# Patient Record
Sex: MALE | Race: WHITE | NOT HISPANIC OR LATINO | Employment: FULL TIME | URBAN - METROPOLITAN AREA
[De-identification: names, ages, dates, MRNs, and addresses within clinical notes are randomized per-mention and may not be internally consistent; named-entity substitution may affect disease eponyms.]

---

## 2020-11-21 ENCOUNTER — AMBULATORY - HEALTHEAST (OUTPATIENT)
Dept: NURSING | Facility: CLINIC | Age: 40
End: 2020-11-21

## 2021-05-27 ENCOUNTER — RECORDS - HEALTHEAST (OUTPATIENT)
Dept: ADMINISTRATIVE | Facility: CLINIC | Age: 41
End: 2021-05-27

## 2024-03-21 ENCOUNTER — OFFICE VISIT (OUTPATIENT)
Dept: FAMILY MEDICINE | Facility: CLINIC | Age: 44
End: 2024-03-21
Payer: COMMERCIAL

## 2024-03-21 VITALS
TEMPERATURE: 97.7 F | BODY MASS INDEX: 23.25 KG/M2 | OXYGEN SATURATION: 98 % | HEIGHT: 69 IN | WEIGHT: 157 LBS | SYSTOLIC BLOOD PRESSURE: 102 MMHG | DIASTOLIC BLOOD PRESSURE: 65 MMHG | HEART RATE: 65 BPM | RESPIRATION RATE: 20 BRPM

## 2024-03-21 DIAGNOSIS — Z83.2 FAMILY HISTORY OF FACTOR V LEIDEN MUTATION: ICD-10-CM

## 2024-03-21 DIAGNOSIS — Z12.11 SCREEN FOR COLON CANCER: ICD-10-CM

## 2024-03-21 DIAGNOSIS — Z11.59 NEED FOR HEPATITIS C SCREENING TEST: ICD-10-CM

## 2024-03-21 DIAGNOSIS — Z80.8 FAMILY HISTORY OF MELANOMA: ICD-10-CM

## 2024-03-21 DIAGNOSIS — Z13.1 SCREENING FOR DIABETES MELLITUS: Primary | ICD-10-CM

## 2024-03-21 DIAGNOSIS — Z13.220 SCREENING FOR HYPERLIPIDEMIA: ICD-10-CM

## 2024-03-21 DIAGNOSIS — Z11.4 SCREENING FOR HIV (HUMAN IMMUNODEFICIENCY VIRUS): ICD-10-CM

## 2024-03-21 DIAGNOSIS — Z00.00 HEALTHCARE MAINTENANCE: ICD-10-CM

## 2024-03-21 LAB
FACTOR V INTERPRETATION: ABNORMAL
LAB DIRECTOR COMMENTS: ABNORMAL
LAB DIRECTOR DISCLAIMER: ABNORMAL
LAB DIRECTOR INTERPRETATION: ABNORMAL
LAB DIRECTOR METHODOLOGY: ABNORMAL
LAB DIRECTOR RESULTS: ABNORMAL
LOCATION OF TASK: ABNORMAL
SPECIMEN DESCRIPTION: ABNORMAL

## 2024-03-21 PROCEDURE — 99386 PREV VISIT NEW AGE 40-64: CPT | Mod: 25 | Performed by: FAMILY MEDICINE

## 2024-03-21 PROCEDURE — 90715 TDAP VACCINE 7 YRS/> IM: CPT | Performed by: FAMILY MEDICINE

## 2024-03-21 PROCEDURE — G0452 MOLECULAR PATHOLOGY INTERPR: HCPCS | Mod: 26 | Performed by: STUDENT IN AN ORGANIZED HEALTH CARE EDUCATION/TRAINING PROGRAM

## 2024-03-21 PROCEDURE — 82947 ASSAY GLUCOSE BLOOD QUANT: CPT | Performed by: FAMILY MEDICINE

## 2024-03-21 PROCEDURE — 87389 HIV-1 AG W/HIV-1&-2 AB AG IA: CPT | Performed by: FAMILY MEDICINE

## 2024-03-21 PROCEDURE — 81241 F5 GENE: CPT | Performed by: FAMILY MEDICINE

## 2024-03-21 PROCEDURE — 86803 HEPATITIS C AB TEST: CPT | Performed by: FAMILY MEDICINE

## 2024-03-21 PROCEDURE — 90471 IMMUNIZATION ADMIN: CPT | Performed by: FAMILY MEDICINE

## 2024-03-21 PROCEDURE — 36415 COLL VENOUS BLD VENIPUNCTURE: CPT | Performed by: FAMILY MEDICINE

## 2024-03-21 PROCEDURE — 80061 LIPID PANEL: CPT | Performed by: FAMILY MEDICINE

## 2024-03-21 SDOH — HEALTH STABILITY: PHYSICAL HEALTH: ON AVERAGE, HOW MANY MINUTES DO YOU ENGAGE IN EXERCISE AT THIS LEVEL?: 60 MIN

## 2024-03-21 SDOH — HEALTH STABILITY: PHYSICAL HEALTH: ON AVERAGE, HOW MANY DAYS PER WEEK DO YOU ENGAGE IN MODERATE TO STRENUOUS EXERCISE (LIKE A BRISK WALK)?: 5 DAYS

## 2024-03-21 ASSESSMENT — SOCIAL DETERMINANTS OF HEALTH (SDOH): HOW OFTEN DO YOU GET TOGETHER WITH FRIENDS OR RELATIVES?: ONCE A WEEK

## 2024-03-21 NOTE — ASSESSMENT & PLAN NOTE
Brother with blood clot and factor V Leiden mutation, father with history of pulmonary embolism.  Check

## 2024-03-21 NOTE — ASSESSMENT & PLAN NOTE
Mom, no particularly concerning lesions but we will have him see dermatology as a baseline given increased risk with family history in first-degree relative.

## 2024-03-21 NOTE — PROGRESS NOTES
Adult Male Physical  A/P  Healthcare maintenance  Healthcare maintenance assessment  Immunization-up-to-date COVID and flu, Tdap given today.  Has had hepatitis B series.  Offered hepatitis A-due to coming in contact with human waste at times in his job as a  at [employer] and possible travel  Cancer screening-colonoscopy  Vascular-blood pressure good, quit smoking a few years ago, check cholesterol  Other-hepatitis C and HIV ordered today.  Low risk.      Family history of melanoma  Mom, no particularly concerning lesions but we will have him see dermatology as a baseline given increased risk with family history in first-degree relative.    Family history of factor V Leiden mutation  Brother with blood clot and factor V Leiden mutation, father with history of pulmonary embolism.  Check         HPI  Current Concerns/ Questions  44-year-old, here to establish care.  No particular questions today.  Quit smoking a few years ago, smoked for 10 years.  Family history as noted above  Starting drinking more with his wife with pandemic but is cut back significantly and does not miss it.    PFSH:  Current medications reviewed as follows:  No current outpatient medications on file prior to visit.  No current facility-administered medications on file prior to visit.     Patient Active Problem List   Diagnosis    Healthcare maintenance    Family history of melanoma    Family history of factor V Leiden mutation     No past medical history on file.  No past surgical history on file.  Family History   Problem Relation Age of Onset    Melanoma Mother     Alcoholism Father     Pulmonary Embolism Father     Factor V Leiden deficiency Brother     Deep Vein Thrombosis (DVT) Brother      History   Smoking Status    Former    Packs/day: 1.00    Years: 10.00    Types: Cigarettes    Start date: 1/1/1996    Quit date: 1/1/2015   Smokeless Tobacco    Never   Works as  [employer].  .  2 children,  "born in about 2013 and 2018    Social History     Social History Narrative    Not on file     Immunization History   Administered Date(s) Administered    COVID-19 12+ (2023-24) (Pfizer) 10/13/2023    COVID-19 Bivalent 12+ (Pfizer) 09/22/2022    COVID-19 MONOVALENT 12+ (Pfizer) 12/12/2021    COVID-19 Monovalent 18+ (Moderna) 02/04/2021, 03/04/2021    DTAP (<7y) 08/05/1980    Hepatitis B, Adult 06/06/2019, 07/16/2019, 12/13/2019    Influenza Vaccine >6 months,quad, PF 11/21/2020, 12/12/2021, 12/20/2022, 10/13/2023    Influenza Vaccine, 6+MO IM (QUADRIVALENT W/PRESERVATIVES) 10/15/2018    MMR 02/05/1980, 04/12/1995    Polio, Unspecified  08/05/1980    TDAP (Adacel,Boostrix) 05/11/2010, 03/21/2024    Td (Adult), Adsorbed 04/05/1993     Body mass index is 23.02 kg/m .        Objective  Physical Exam  Vitals:    03/21/24 1108   BP: 102/65   Pulse: 65   Resp: 20   Temp: 97.7  F (36.5  C)   SpO2: 98%   Weight: 71.2 kg (157 lb)   Height: 1.759 m (5' 9.25\")       Gen- alert and oriented x3, no acute distress  HEENT- Normocephalic atraumatic   pupils equal and reactive, EOMI.    TMs visualized and normal, ear canals normal.    Mouth moist with normal mucosa no ulceration, dentition normal or in good repair.  Neck- supple, no adenopathy or thyromegaly  Chest- Normal chest wall apperance, normal inspiration and expiration.  Clear to asculation.  CV- Regular rate and rhythm, normal tones, no murmus, gallops or rubs.  Abd-  Soft, nodistended, nontender.  Normal bowel sounds, no mass or organ enlargement.  Genitalia-  was not done  JANE: was not done  Ext- Atraumatic,  No synovial thickening. Good perfusion, no edema. Periph pulses detected  Skin- warm and dry, no rash  Neuro- Cranial nerves grossly intact.  Normal gait, normal strength.  Reflexes symmetric.  Coordination intact.    Diagnostics  No results found for any visits on 03/21/24.                  Answers submitted by the patient for this visit:  General Questionnaire " (Submitted on 3/20/2024)  Chief Complaint: Chronic problems general questions HPI Form  What is the reason for your visit today? : General health assessment  How many servings of fruits and vegetables do you eat daily?: 2-3  On average, how many sweetened beverages do you drink each day (Examples: soda, juice, sweet tea, etc.  Do NOT count diet or artificially sweetened beverages)?: 0  How many minutes a day do you exercise enough to make your heart beat faster?: 20 to 29  How many days a week do you exercise enough to make your heart beat faster?: 5  How many days per week do you miss taking your medication?: 0

## 2024-03-21 NOTE — ASSESSMENT & PLAN NOTE
Healthcare maintenance assessment  Immunization-up-to-date COVID and flu, Tdap given today.  Has had hepatitis B series.  Offered hepatitis A-due to coming in contact with human waste at times in his job as a  at [employer] and possible travel  Cancer screening-colonoscopy  Vascular-blood pressure good, quit smoking a few years ago, check cholesterol  Other-hepatitis C and HIV ordered today.  Low risk.

## 2024-03-22 LAB
CHOLEST SERPL-MCNC: 150 MG/DL
FASTING STATUS PATIENT QL REPORTED: NO
FASTING STATUS PATIENT QL REPORTED: NO
GLUCOSE SERPL-MCNC: 86 MG/DL (ref 70–99)
HCV AB SERPL QL IA: NONREACTIVE
HDLC SERPL-MCNC: 38 MG/DL
HIV 1+2 AB+HIV1 P24 AG SERPL QL IA: NONREACTIVE
LDLC SERPL CALC-MCNC: 94 MG/DL
NONHDLC SERPL-MCNC: 112 MG/DL
TRIGL SERPL-MCNC: 89 MG/DL

## 2024-03-26 PROBLEM — D68.51 HOMOZYGOUS FACTOR V LEIDEN MUTATION (H): Status: ACTIVE | Noted: 2024-03-26

## 2024-04-02 DIAGNOSIS — D68.51 HOMOZYGOUS FACTOR V LEIDEN MUTATION (H): Primary | ICD-10-CM

## 2024-05-07 NOTE — PROGRESS NOTES
[facility]  [address] [place] [id number]  Phone: [phone], Fax: [phone]    Outpatient Visit Note:    Patient: Black Kline  MRN: [id number]  : 1980  CAMILLE: May 9, 2024    Reason for Consultation:  Black Kline is referred for evaluation and treatment of homozygous factor V Leiden.    Assessment:  In summary, Black Kline is a 44 year old male with family history of venous thromboembolism and factor V Leiden who was recently found to be homozygous for factor V Leiden genetic mutation. He has never personally had venous thromboembolism. He has not had many challenges in terms of surgeries or procedures or immobilization. His brother is also homozygous for factor V Leiden and has had two unprovoked DVTs. His father and mother are heterozygous. His father had an unprovoked PE.      Black does not have significant lifestyle risk factors for venous thromboembolism. He is a non smoker. He is quite active. He is not obese. He does occasionally participate in long distance travel.     He will need colonoscopy this next year.     Plan:    I counseled Black that Factor V Leiden mutation is a common genetic thrombophilia, about 5% of the general population carries this mutation. Only about 10% of those with the mutation ever develop a thromboembolic event in their lifetime and it is usually associated with other provoking factors. People with heterozygous Factor V Leiden Mutation have a 3-5 fold increase risk of venous thrombosis. However, patients with homozygous Factor V Leiden mutation have over 10 fold increased risk of venous thrombosis.     There is no data currently that shows a benefit of long-term anticoagulation in asymptomatic individuals who are homozygous for FVL, and thus we generally do not anticoagulate them in the absence of VTE.     We do recommend staying active, wearing compression socks during periods of immobility and travel, staying hydrated  and contacting the office to discuss prophylactic intensity anticoagulation during higher risk time periods. Patients with homozygous factor V Leiden should be on prophylactic intensity anticoagulation during high-risk situations including after major surgery, after trauma, immobilization for >3 days, and with prolonged travel > 4-6 hours in duration. The patient was instructed to contact the office should any of this situations arise.      We discussed for travel making sure he gets out and walks every 2 hours, wears compression socks and stays hydrated.     Reviewed signs/symptoms of venous thromboembolism and when to present for evaluation.     He was counseled that his sons are heterozygous for factor V Leiden presuming their mother does not have FVL. Recommend testing in teenage years or prior to first surgery. People with FVL should avoid exogenous hormones including estrogen.    He does not need any anticoagulation following colonoscopy.      We will see him every 3 years or as needed.     The patient is given our center's contact information and is instructed to call if they should have any further questions or concerns.      Patient understands and agrees with the above plan and recommendation.      Yulia Chin, MPH, PA-C  [facility]    60 minutes spent by me on the date of the encounter doing chart review, review of outside records, review of test results, interpretation of tests, patient visit, and documentation     ----------------------------------------------------------------------------------------------------------------------  History of Present Illness:  Black Kline is a 44 year old male who in in good health and on no medications. He recently established care with a primary care provider and he reported family history of venous thromboembolism and factor V Leiden that prompted his testing. He was found to be homozygous.     Black  notes that his brother has a history of lower extremity deep vein thrombosis. After his brother's diagnosis, his brother reported similar symptoms previously in his life that he had not been evaluated for thus likely has had recurrent unprovoked DVTs. He notes that his father had an unprovoked pulmonary embolism in 2023. His father and brother had factor V Leiden testing. His brother was found to be homozygous and his father was heterozygous. His mother then was tested and as expected was heterozygous. Recently Black himself had testing and this revealed   he is HOMOzygous for factor V Leiden.     Krish notes that he has never had a superficial or deep vein thrombosis event. He has no varicosities. He does not smoker. He is quite active as he is employed as a . He denies any prior surgeries other than wisdom tooth extraction. No history of immobilization, fractures, or hospitalizations. He has participated in flights and road trips without difficulties. He notes that he will be needing his first colonoscopy and also will be participating in a few long distance trips this summer.     He has no other chronic medical conditions and is not on any prescription or OTC medications. He is . He has two sons age 10 and 6. Neither of his sons have had surgeries or fractures. They have not had testing yet.     Past Medical History:  Past Medical History:   Diagnosis Date    Homozygous Factor V Leiden mutation (H24)     Needs VTE prophylaxis in hospital and after surgeries       Past Surgical History:  Past Surgical History:   Procedure Laterality Date    WISDOM TOOTH EXTRACTION         Medications:  No current outpatient medications on file.        Allergies:  No Known Allergies    ROS:  Remainder of a comprehensive 14 point ROS is negative unless noted above.    Social History:  Patient works as a   Denies any tobacco use. No significant alcohol use. Denies any illicit drug use.     Family  "History:  Family history of venous thromboembolism in father with PE and brother with recurrent DVTs, all unprovoked. No other siblings.   Mother and father both heterozygous for FVL   Brother homozygous for FVL   He has two sons, have not had testing, at least heterozygous for FVL.     Objectives:  Vitals: /67   Pulse 61   Temp 97.4  F (36.3  C) (Oral)   Ht 1.759 m (5' 9.25\")   Wt 70.9 kg (156 lb 4.8 oz)   SpO2 99%   BMI 22.92 kg/m     Exam:   Constitutional: Appears well, no distress  HEENT: Pupils equal and round. No scleral icterus or hemorrhage.   Respiratory: no increased work of breathing.   Mus/Skele: no edema of lower extremities  Skin: no petechiae, no ecchymosis on exposed dermis.  Neuro: CN II-XII intact. Normal gait. AOx3      Labs:  Factor V 1691G>A (Leiden)  RESULTS:  Mutation analyzed: 1691G>A  Factor V 1691G>A (Leiden)  Interpretation:  PRESENT  Factor V 1691G>A (Leiden) mutation  genotype:  A/A      INTERPRETATION    The patient is a homozygote (two copies of the gene positive) for the F5 gene mutation R506Q (1691G>A) mutation. The presence of the F5 gene mutation R506Q (1691G>A) mutation is an indication of an increased risk of developing a thrombosis. The extent of this risk is dependent upon several other known thrombophilic risk factors including smoking, obesity and the use of oral contraceptives. Consultation regarding these results is available upon request. Genetic counseling regarding these results is indicated in the evaluation of other family members.   (Electronically signed by: TOD DAILEY MD March 25, 2024 8:57 PM)           Imaging:  No results found for this or any previous visit (from the past 744 hour(s)).            "

## 2024-05-09 ENCOUNTER — OFFICE VISIT (OUTPATIENT)
Dept: HEMATOLOGY | Facility: CLINIC | Age: 44
End: 2024-05-09
Attending: FAMILY MEDICINE
Payer: COMMERCIAL

## 2024-05-09 VITALS
HEIGHT: 69 IN | TEMPERATURE: 97.4 F | SYSTOLIC BLOOD PRESSURE: 115 MMHG | OXYGEN SATURATION: 99 % | HEART RATE: 61 BPM | DIASTOLIC BLOOD PRESSURE: 67 MMHG | WEIGHT: 156.3 LBS | BODY MASS INDEX: 23.15 KG/M2

## 2024-05-09 DIAGNOSIS — Z82.49 FAMILY HISTORY OF DEEP VENOUS THROMBOSIS: ICD-10-CM

## 2024-05-09 DIAGNOSIS — Z83.2 FAMILY HISTORY OF FACTOR V LEIDEN MUTATION: ICD-10-CM

## 2024-05-09 DIAGNOSIS — D68.51 HOMOZYGOUS FACTOR V LEIDEN MUTATION (H): Primary | ICD-10-CM

## 2024-05-09 DIAGNOSIS — Z71.89 ENCOUNTER FOR ANTICOAGULATION DISCUSSION AND COUNSELING: ICD-10-CM

## 2024-05-09 PROCEDURE — 99205 OFFICE O/P NEW HI 60 MIN: CPT | Performed by: PHYSICIAN ASSISTANT

## 2024-05-09 PROCEDURE — 99213 OFFICE O/P EST LOW 20 MIN: CPT | Performed by: PHYSICIAN ASSISTANT

## 2024-05-09 NOTE — PATIENT INSTRUCTIONS
[facility]  [facility]  [facility] [address], [address], [place] [address]  Main: [phone], Fax: [phone]    Black,   It was a pleasure seeing you today.  Thank you for allowing us to be involved in your care.  Please let us know if there is anything else we can do for you, so that we can be sure you are leaving completely satisfied with your care experience. Below is the plan that we discussed.     Factor V Leiden mutation is a common genetic thrombophilia, about 5% of the general population carries this mutation. Only about 10% of those with the mutation ever develop a thromboembolic event in their lifetime and it is usually associated with other provoking factors. People with heterozygous Factor V Leiden Mutation have a 3-5 fold increase risk of venous thrombosis. However, patients with homozygous Factor V Leiden mutation have over 10 fold increased risk of venous thrombosis.   There is no data currently that shows a benefit of long-term anticoagulation in asymptomatic individuals without clotting history who are homozygous for FVL  We do recommend staying active, wearing compression socks (knee high 20-30mmHg) during periods of immobility, staying hydrated and contacting the office to discuss low dose anticoagulation during higher risk time periods. Patients with homozygous factor V Leiden should be on prophylactic intensity anticoagulation during high-risk situations including after major surgery, after trauma, immobilization for >3 days, and with prolonged travel > 4-6 hours in duration. Please contact the office should any of this situations arise.    Your kids are heterozygous for factor V Leiden presuming their mother does not have FVL. Recommend testing in teenage years or prior to first surgery.   People with FVL should avoid exogenous hormones including estrogen and testosterone.       We would like a provider on our team to see you at least  annually for optimal care and to allow us to continue to prescribe for you.        Return to clinic in 3 years or sooner if needed.     If you have questions or concerns, please don't hesitate to send a [employer] Message for non urgent matters or contact my nurse clinician, Krish. His number is [phone]. If they are unavailable and you have immediate concerns, please call [phone] and ask for a nurse.     Yulia Chin, MPH, PA-C  [facility]      [email or web address]

## 2024-07-09 ENCOUNTER — HOSPITAL ENCOUNTER (OUTPATIENT)
Dept: ULTRASOUND IMAGING | Facility: HOSPITAL | Age: 44
Discharge: HOME | End: 2024-07-09
Attending: PHYSICIAN ASSISTANT | Admitting: PHYSICIAN ASSISTANT
Payer: COMMERCIAL

## 2024-07-09 DIAGNOSIS — D68.51 HOMOZYGOUS FACTOR V LEIDEN MUTATION (H): ICD-10-CM

## 2024-07-09 PROCEDURE — 93971 EXTREMITY STUDY: CPT | Mod: LT

## 2025-02-19 ENCOUNTER — PATIENT OUTREACH (OUTPATIENT)
Dept: CARE COORDINATION | Facility: CLINIC | Age: 45
End: 2025-02-19
Payer: COMMERCIAL

## 2025-03-05 ENCOUNTER — PATIENT OUTREACH (OUTPATIENT)
Dept: CARE COORDINATION | Facility: CLINIC | Age: 45
End: 2025-03-05
Payer: COMMERCIAL

## 2025-04-28 ENCOUNTER — ANESTHESIA EVENT (OUTPATIENT)
Dept: SURGERY | Facility: AMBULATORY SURGERY CENTER | Age: 45
End: 2025-04-28
Payer: COMMERCIAL

## 2025-04-28 ENCOUNTER — ANESTHESIA (OUTPATIENT)
Dept: SURGERY | Facility: AMBULATORY SURGERY CENTER | Age: 45
End: 2025-04-28
Payer: COMMERCIAL

## 2025-04-28 RX ORDER — PROPOFOL 10 MG/ML
INJECTION, EMULSION INTRAVENOUS PRN
Status: DISCONTINUED | OUTPATIENT
Start: 2025-04-28 | End: 2025-04-28

## 2025-04-28 RX ORDER — LIDOCAINE HYDROCHLORIDE 20 MG/ML
INJECTION, SOLUTION INFILTRATION; PERINEURAL PRN
Status: DISCONTINUED | OUTPATIENT
Start: 2025-04-28 | End: 2025-04-28

## 2025-04-28 RX ORDER — PROPOFOL 10 MG/ML
INJECTION, EMULSION INTRAVENOUS CONTINUOUS PRN
Status: DISCONTINUED | OUTPATIENT
Start: 2025-04-28 | End: 2025-04-28

## 2025-04-28 RX ADMIN — LIDOCAINE HYDROCHLORIDE 2 ML: 20 INJECTION, SOLUTION INFILTRATION; PERINEURAL at 14:14

## 2025-04-28 RX ADMIN — PROPOFOL 160 MCG/KG/MIN: 10 INJECTION, EMULSION INTRAVENOUS at 14:14

## 2025-04-28 RX ADMIN — PROPOFOL 50 MG: 10 INJECTION, EMULSION INTRAVENOUS at 14:14

## 2025-04-28 RX ADMIN — PROPOFOL 20 MG: 10 INJECTION, EMULSION INTRAVENOUS at 14:17

## 2025-04-28 ASSESSMENT — LIFESTYLE VARIABLES: TOBACCO_USE: 1

## 2025-04-28 ASSESSMENT — ENCOUNTER SYMPTOMS
SEIZURES: 0
DYSRHYTHMIAS: 0

## 2025-04-28 ASSESSMENT — COPD QUESTIONNAIRES: COPD: 0

## 2025-04-28 NOTE — ANESTHESIA PREPROCEDURE EVALUATION
Anesthesia Pre-Procedure Evaluation    Patient: Black Kline   MRN: [id number] : 1980        Procedure : Procedure(s):  COLONOSCOPY          Past Medical History:   Diagnosis Date    Homozygous Factor V Leiden mutation     Needs VTE prophylaxis in hospital and after surgeries      Past Surgical History:   Procedure Laterality Date    WISDOM TOOTH EXTRACTION        No Known Allergies   Social History     Tobacco Use    Smoking status: Former     Current packs/day: 0.00     Average packs/day: 1 pack/day for 19.0 years (19.0 ttl pk-yrs)     Types: Cigarettes     Start date: 1996     Quit date: 2015     Years since quitting: 10.3     Passive exposure: Never    Smokeless tobacco: Never   Substance Use Topics    Alcohol use: Yes     Comment: 5 drink per week      Wt Readings from Last 1 Encounters:   25 68 kg (150 lb)        Anesthesia Evaluation   Pt has had prior anesthetic.     No history of anesthetic complications       ROS/MED HX  ENT/Pulmonary:     (+)                tobacco use, Past use,                    (-) asthma, COPD, sleep apnea, RENZO risk factors and recent URI   Neurologic:    (-) no seizures and no CVA   Cardiovascular:    (-) hypertension, taking anticoagulants/antiplatelets, syncope and arrhythmias   METS/Exercise Tolerance: >4 METS    Hematologic: Comments: Factor V Leiden, no hx of clots, no blood thinners   (-) anemia   Musculoskeletal:       GI/Hepatic:    (-) GERD   Renal/Genitourinary:    (-) renal disease   Endo:    (-) Type I DM, Type II DM, thyroid disease and obesity   Psychiatric/Substance Use:    (-) chronic opioid use history   Infectious Disease:    (-) Recent Fever   Malignancy:       Other:            Physical Exam    Airway        Mallampati: II   TM distance: > 3 FB   Neck ROM: full   Mouth opening: > 3 cm    Respiratory Devices and Support         Dental       (+) Completely normal teeth      Cardiovascular          Rhythm and rate: regular     Pulmonary    "pulmonary exam normal                OUTSIDE LABS:  CBC: No results found for: \"WBC\", \"HGB\", \"HCT\", \"PLT\"  BMP:   Lab Results   Component Value Date    GLC 86 03/21/2024     COAGS: No results found for: \"PTT\", \"INR\", \"FIBR\"  POC: No results found for: \"BGM\", \"HCG\", \"HCGS\"  HEPATIC: No results found for: \"ALBUMIN\", \"PROTTOTAL\", \"ALT\", \"AST\", \"GGT\", \"ALKPHOS\", \"BILITOTAL\", \"BILIDIRECT\", \"KADY\"  OTHER: No results found for: \"PH\", \"LACT\", \"A1C\", \"FARHANA\", \"PHOS\", \"MAG\", \"LIPASE\", \"AMYLASE\", \"TSH\", \"T4\", \"T3\", \"CRP\", \"SED\"    Anesthesia Plan    ASA Status:  2    NPO Status:  NPO Appropriate    Anesthesia Type: MAC.              Consents    Anesthesia Plan(s) and associated risks, benefits, and realistic alternatives discussed. Questions answered and patient/representative(s) expressed understanding.     - Discussed:     - Discussed with:  Patient, Spouse      - Extended Intubation/Ventilatory Support Discussed: No.      - Patient is DNR/DNI Status: No     Use of blood products discussed: No .     Postoperative Care            Comments:               Christie Looney MD    Clinically Significant Risk Factors Present on Admission                                          "

## 2025-04-28 NOTE — ANESTHESIA CARE TRANSFER NOTE
Patient: Black Kline    Procedure: Procedure(s):  COLONOSCOPY       Diagnosis: Colon cancer screening [Z12.11]  Diagnosis Additional Information: No value filed.    Anesthesia Type:   MAC     Note:    Oropharynx: oropharynx clear of all foreign objects  Level of Consciousness: drowsy  Oxygen Supplementation: face mask  Level of Supplemental Oxygen (L/min / FiO2): 6  Independent Airway: airway patency satisfactory and stable  Dentition: dentition unchanged  Vital Signs Stable: post-procedure vital signs reviewed and stable  Report to RN Given: handoff report given  Patient transferred to: Phase II    Handoff Report: Identifed the Patient, Identified the Reponsible Provider, Reviewed the pertinent medical history, Discussed the surgical course, Reviewed Intra-OP anesthesia mangement and issues during anesthesia, Set expectations for post-procedure period and Allowed opportunity for questions and acknowledgement of understanding      Vitals:  Vitals Value Taken Time   BP     Temp     Pulse     Resp     SpO2         Electronically Signed By: Christie Looney MD  April 28, 2025  2:36 PM

## 2025-04-28 NOTE — ANESTHESIA POSTPROCEDURE EVALUATION
Patient: Black Kline    Procedure: Procedure(s):  COLONOSCOPY       Anesthesia Type:  MAC    Note:  Disposition: Outpatient   Postop Pain Control: Uneventful            Sign Out: Well controlled pain   PONV: No   Neuro/Psych: Uneventful            Sign Out: Acceptable/Baseline neuro status   Airway/Respiratory: Uneventful            Sign Out: Acceptable/Baseline resp. status   CV/Hemodynamics: Uneventful            Sign Out: Acceptable CV status; No obvious hypovolemia; No obvious fluid overload   Other NRE: NONE   DID A NON-ROUTINE EVENT OCCUR? No           Last vitals:  Vitals Value Taken Time   /63 04/28/25 1445   Temp 96.8  F (36  C) 04/28/25 1436   Pulse 54 04/28/25 1454   Resp 16 04/28/25 1436   SpO2 98 % 04/28/25 1454   Vitals shown include unfiled device data.    Electronically Signed By: Christie Looney MD  April 28, 2025  2:56 PM

## 2025-05-10 ENCOUNTER — HEALTH MAINTENANCE LETTER (OUTPATIENT)
Age: 45
End: 2025-05-10